# Patient Record
Sex: MALE
[De-identification: names, ages, dates, MRNs, and addresses within clinical notes are randomized per-mention and may not be internally consistent; named-entity substitution may affect disease eponyms.]

---

## 2019-07-06 ENCOUNTER — HOSPITAL ENCOUNTER (EMERGENCY)
Dept: HOSPITAL 88 - ER | Age: 49
Discharge: HOME | End: 2019-07-06
Payer: COMMERCIAL

## 2019-07-06 VITALS — HEIGHT: 68 IN | BODY MASS INDEX: 30.31 KG/M2 | WEIGHT: 200 LBS

## 2019-07-06 VITALS — DIASTOLIC BLOOD PRESSURE: 85 MMHG | SYSTOLIC BLOOD PRESSURE: 126 MMHG

## 2019-07-06 DIAGNOSIS — H60.311: Primary | ICD-10-CM

## 2019-07-06 PROCEDURE — 99282 EMERGENCY DEPT VISIT SF MDM: CPT

## 2019-07-06 NOTE — XMS REPORT
Clinical Summary

                             Created on: 2019



Dimas Navarro

External Reference #: [id number]

: 1970

Sex: Male



Demographics







                          Address                   [address] [place]  [address]

 

                          Home Phone                [phone]

 

                          Preferred Language        Unknown

 

                          Marital Status            Single

 

                          Ep[employer] Affiliation     [employer]

 

                          Race                      Unknown

 

                          Ethnic Group              Unknown





Author







                          Author                    [facility]

 

                          Organization              [facility]

 

                          Address                   Unknown

 

                          Phone                     Unavailable







Support







                Name            Relationship    Address         Phone

 

                    Hoa Navarro      [employer]                [address] [place]  [address]                     [phone]







Care Team Providers







                    Care Team Member Name    Role                Phone

 

                    Tabatha Bello MD    PCP                 [phone]







Allergies







                                        Comments



                 Active Allergy     Reactions       Severity        Noted Date 

 

                                        



Pt unsure of reaction-diagnosed as a child



                           Penicillins               2019 







Medications







                          End Date                  Status



              Medication     Sig          Dispensed     Refills      Start  



                                         Date  

 

                                                    Active



              lisinopril (PRINIVIL) 10     Take 1 tablet     90 tablet     1            /201  



                     mg tabletIndications:     by mouth            9  



                           Essential hypertension     daily.     







Active Problems





Not on file



Encounters







                          Care Team                 Description



                     Date                Type                Specialty  

 

                                        



Tabatha Bello MD                      Need for vaccination (Primary Dx); 

Essential hypertension



                     2019          Office Visit        Family Practice  

 

                                                     



                           2019                Travel   



after 2018



Immunizations







  



                     Name                Administration Dates     Next Due

 

  



                           Tdap (Tetanus Toxoid,     2019 (Deferred: Patient Refused) 



                                         Reduced Diphtheria Toxoid  



                                         And Acellular Pertussis,  



                                         Absorbed)  







Family History







   



                 Relation        Name            Status          Comments

 

   



                     Brother             1                   Alive 

 

   



                           Father                     

 

   



                           Maternal Grandfather       

 

   



                           Maternal Grandmother       

 

   



                           Mother                    Alive 

 

   



                           Paternal Grandfather       

 

   



                           Paternal Grandmother       

 

   



                     Sister              1                   Alive 







Social History







                                        Date



                 Tobacco Use     Types           Packs/Day       Years Used 

 

                                         



                                         Never Smoker    

 

    



                                         Smokeless Tobacco: Never   



                                         Used   









                    Drinks/Week         oz/Week             Comments



                                         Alcohol Use   

 

                                                             



                                         Not Currently   









  



                     Food Insecurity     Answer              Date Recorded

 

  



                     Within the past 12 months, you worried that your     Never true          2019



                                         food would run out before you got money to buy  



                                         more.  

 

  



                     Within the past 12 months, the food you bought     Never true          2019



                                         just didn't last and you didn't have money to get  



                                         more.  









 



                           Sex Assigned at Birth     Date Recorded

 

 



                                         Not on file 









                                        Industry



                           Job Start Date            Occupation 

 

                                        Not on file



                           Not on file               Not on file 









                                        Travel End



                           Travel History            Travel Start 

 





                                         No recent travel history available.







Last Filed Vital Signs







                    Reading             Time Taken          Comments



                                         Vital Sign   

 

                    134/84              2019  3:05 PM CDT     



                                         Blood Pressure   

 

                    100                 2019  3:05 PM CDT     



                                         Pulse   

 

                    37.1 C (98.7 F)    2019  3:05 PM CDT     



                                         Temperature   

 

                    18                  2019  3:05 PM CDT     



                                         Respiratory Rate   

 

                    -                   -                    



                                         Oxygen Saturation   

 

                    -                   -                    



                                         Inhaled Oxygen   



                                         Concentration   

 

                    86.2 kg (190 lb)    2019  3:05 PM CDT     



                                         Weight   

 

                    172.7 cm (5' 8")    2019  3:05 PM CDT     



                                         Height   

 

                    28.89               2019  3:05 PM CDT     



                                         Body Mass Index   







Plan of Treatment







   



                 Health Maintenance     Due Date        Last Done       Comments

 

   



                           IMM Influenza Seasonal     10/01/2019  



                                         Oct to March (>/=19 yrs)   







Procedures







                                        Comments



                 Procedure Name     Priority        Date/Time       Associated Diagnosis 

 

                                        



Results for this procedure are in the results section.



                     POC BMP - IN LAB (STAT)     Routine             2019  



                                         4:04 PM CDT  



after 2018



Results

* BMP POC (2019  4:04 PM CDT)





    



              Component     Value        Ref Range     Performed At     Pathologist



                                         Signature

 

    



                 TCO2 POC        26              21 - 32 mmol/L     [facility] 

 

    



                 Chloride POC     103             98 - 107 mmol/L     [facility] 

 

    



                 Potassium POC     4.1             3.50 - 5.10 mmol/L     [facility] 

 

    



                 Sodium POC      142             136 - 145 mmol/L     [facility] 

 

    



                 Glucose POC     130 (H)         74 - 106 mg/dL     [facility] 

 

    



                 Urea Nitrogen     18              7 - 18 mg/dL     [facility] 



                                         POC    

 

    



                 Creatinine POC     0.9             0.6 - 1.3 mg/dL     [facility] 

 

    



                 Ionized Calcium     1.09 (L)        1.15 - 1.29 mmol/L     [facility] 



                                         POC    

 

    



                 GFR, Estimated     >60             mL/min/1.73 m2     [facility] 

 

    



                 GFR, Estim,     >60             mL/min/1.73 m2     [facility] 



                                         Afr-Am    













                                         Specimen

 











   



                 Performing Organization     Address         City/State/Zipcode     Phone Number

 

   



                                         [facility]   

 

   



                                         [facility]   





after 2018



Insurance







                                        Type



            Payer      Benefit     Subscriber ID     Effective     Phone      Address 



                           Plan /                    Dates   



                                         Group     

 

                                         



            [facility] CHOICE     xxxxxxxxx     2019-P     [phone]     [address]

 



                     [address]                [address]              [id number] 



                                         [place], 



                                         [place] 



                                         [address] 

 

                                         



            [employer]     [employer]     xxxxxxxxxx     3/1/2019-P     [phone]     [address] 



                     [employer]          resent              [phone] 



                           [place], [place] 



                                         [address] 









     



            Guarantor Name     Account     Relation to     Date of     Phone      Billing Address



                     Type                Patient             Birth  

 

     



            Dimas Navarro     Personal/F     Self       1970     [phone] [address]               ([place])              [place] [address]

## 2019-07-06 NOTE — XMS REPORT
Patient Summary Document

                             Created on: 2019



LU AGUIRRE

External Reference #: [id number]

: 1970

Sex: Male



Demographics







                          Address                   [address] [place]  [address]

 

                          Home Phone                [phone]

 

                          Preferred Language        Unknown

 

                          Marital Status            Unknown

 

                          Baptism Affiliation     Unknown

 

                          Race                      Unknown

 

                          Ethnic Group              Unknown





Author







                          Author                    [facility]

 

                          [facility]

 

                          Address                   Unknown

 

                          Phone                     Unavailable







Support







                Name            Relationship    Address         Phone

 

                    SAMUEL SANABRIA      [employer]                 [address] [place], [place]  [id number] [phone]

 

                    SAMUEL SANABRIA      PRS                 [address] [place]  [address]                      [phone]

 

                    MARISOL AGUIRRE    PRS                 [address] [place]  [address]                      [phone]







Care Team Providers







                    Care Team Member Name    Role                Phone

 

                          Unavailable               Unavailable







Payers







             Payer Name    Policy Type    Policy Number    Effective Date    Expiration Date







Problems

This patient has no known problems.



Allergies, Adverse Reactions, Alerts







          Allergy Name    Allergy Type    Status    Severity    Reaction(s)    Onset Date    Inactive 

Date                      Treating Clinician        Comments

 

        Penicillins    DA      Active    U               2019 00:00:00                     

 

        No Known Contrast Allergies    DA      Active    U               2003-10-17 00:00:00                     

 

        No Known Food Allergies    DA      Active    U               2003-10-17 00:00:00                     

 

        No Known Other Allergies    DA      Active    U               2003-10-17 00:00:00                     

 

        PENICILLIN    DA      Active    U               2003-10-17 00:00:00                     







Medications

This patient has no known medications.



Encounters







             Start Date/Time    End Date/Time    Encounter Type    Admission Type    Attending Clinicians

                    Care Facility       Care Department     Encounter ID

 

        2019 15:46:08    2019 15:46:08    Outpatient                    [facility]     [id number]

 

        2019 15:00:57    2019 15:00:57    Outpatient                    [facility]     [id number]







Results







           Test Description    Test Time    Test Comments    Text Results    Atomic Results    Result

 Comments

 

                TROPONIN-I      2019 13:46:00                      

 

   

 

                TROPONIN-I (test code=TROPI)    <0.015 ng/mL    0-0.045          





BASIC METABOLIC [id number] 13:43:00* 





                Test Item       Value           Reference Range    Comments

 

                SODIUM (test code=NA)    139 mmol/L      136-145          

 

                POTASSIUM (test code=K)    3.7 mmol/L      3.5-5.1          

 

                CHLORIDE (test code=CL)    105.0 mmol/L               

 

                CARBON DIOXIDE (test code=CO2)    25.0 mmol/L     21-32            

 

                ANION GAP (test code=GAP)    12.7            10-20            

 

                GLUCOSE (test code=GLU)    114 mg/dL                  

 

                BLOOD UREA NITROGEN (test code=BUN)    12 mg/dL        7-18             

 

                GLOMERULAR FILTRATION RATE (test code=GFR)    > 60 mL/min     >=60            Estimated GFR by 

using Modified MDRD formula.Chronic kidney disease is defined as either kidney 
damageor GFR <60 mL/min/1.73 m2 for >3 months.

 

                CREATININE (test code=CREAT)    1.00 mg/dL      0.7-1.3          

 

                BUN/CREATININE RATIO (test code=BUN/CREA)    12.0            10-20            

 

                CALCIUM (test code=CA)    8.8 mg/dL       8.5-10.1         





BASIC METABOLIC [id number] 13:40:00* 





                Test Item       Value           Reference Range    Comments

 

                SODIUM (test code=NA)    139 mmol/L      136-145          

 

                POTASSIUM (test code=K)    3.7 mmol/L      3.5-5.1          

 

                CHLORIDE (test code=CL)    105.0 mmol/L               

 

                CARBON DIOXIDE (test code=CO2)     mmol/L         21-32            

 

                ANION GAP (test code=GAP)                    10-20            

 

                GLUCOSE (test code=GLU)     mg/dL                     

 

                BLOOD UREA NITROGEN (test code=BUN)     mg/dL          7-18             

 

                GLOMERULAR FILTRATION RATE (test code=GFR)     mL/min         >=60             

 

                CREATININE (test code=CREAT)     mg/dL          0.7-1.3          

 

                BUN/CREATININE RATIO (test code=BUN/CREA)                    10-20            

 

                CALCIUM (test code=CA)     mg/dL          8.5-10.1         





- CT HEAD/BRAIN W/O [id number] 13:37:00  Name: LU AGUIRRE                
    [facility]                     : 1970 Age/S: 49  / M         
[address] [facility]                Unit #: [id number]     Loc:               
[place]  [address]              Phys: Greg Ferguson MD                        
                           Acct: [id number]  Dis Date:               Status: 
REG ER                                  PHONE #: [phone]     Exam Date: 
2019  1320                     FAX #: [phone]      Reason: headache 
dizzy                                      EXAMS:                               
               CPT CODE:      [id number] CT HEAD/BRAIN W/O CONT                  
  15[address]                    HISTORY: Headache and hypertension.               
COMPARISON: None available.                CT brain without contrast: Automated 
exposure control.               No acute intracranial bleeds or extra-axial 
collections and  there is       no acute territorial vascular infarction.       
       The gray-white matter differentiation is preserved. The sulci, gyri,     
 ventricles and subarachnoid spaces and the basilar cisterns are normal       
for patient's age. No herniation or hydrocephalus or midline shift is       
noted. Fourth ventricle remains midline.               Portions of the 
visualized paranasal sinuses are unremarkable.               No obvious bony 
calvarial defect is noted.                 IMPRESSION:                   No 
acute intracranial bleeds or extra-axial collections.                   No acute
territorial vascular infarction.                   No herniation or hydrocepha
haroldo or midline shift.                    ** Electronically Signed by WAI Howard on 2019 at 1337 **                      Reported and signed by: Kalyan Howard M.D.             CC: Greg Ferguson MD                                
                                                                                
      Technologist:Marcelina BelcherRT(R),CT            CTDI:        DLP:        Tr
nscb Date/Time: 2019 (2449) t.SDR.TH4                        Orig Print D/
T: S: 2019 (1649)       CTDI:          DLP:          PAGE  1              
        Signed Report                               CBC W/AUTO [id number] 
13:20:00* 





                Test Item       Value           Reference Range    Comments

 

                WHITE BLOOD CELL (test code=WBC)    8.5 K/mm3       4.5-12.5         

 

                RED BLOOD CELL (test code=RBC)    5.30 mill/mm3    4.0-5.8          

 

                HEMOGLOBIN (test code=HGB)    15.9 gram/dL    13.0-17.5        

 

                HEMATOCRIT (test code=HCT)    47.8 %          42.0-52.0        

 

                MEAN CELL VOLUME (test code=MCV)    90.2 fL         80-98            

 

                MEAN CELL HGB (test code=MCH)    30.0 picogram    27.0-33.0        

 

                MEAN CELL HGB CONCETRATION (test code=MCHC)    33.3 gram/dL    33.0-36.0        

 

                RED CELL DISTRIBUTION WIDTH (test code=RDW)    13.1 %          11.6-16.2        

 

                RED CELL DISTRIBUTION WIDTH SD (test code=RDW-SD)    43.0 fL         37.0-51.0        

 

                PLATELET COUNT (test code=PLT)    200 K/mm3       150-450          

 

                MEAN PLATELET VOLUME (test code=MPV)    10.8 fL         6.7-11.0         

 

                NEUTROPHIL % (test code=NT%)    80.9 %          39.0-69.0        

 

                IMMATURE GRANULOCYTE % (test code=IG%)    0.4 %           0.0-5.0          

 

                LYMPHOCYTE % (test code=LY%)    13.7 %          25.0-55.0        

 

                MONOCYTE % (test code=MO%)    4.7 %           0.0-10.0         

 

                EOSINOPHIL % (test code=EO%)    0.1 %           0.0-5.0          

 

                BASOPHIL % (test code=BA%)    0.2 %           0.0-1.0          

 

                NUCLEATED RBC % (test code=NRBC%)    0.0 %           0-0              

 

                NEUTROPHIL # (test code=NT#)    6.88 K/mm3      1.8-7.7          

 

                IMMATURE GRANULOCYTE # (test code=IG#)    0.03 x10 3/uL    0-0.03           

 

                LYMPHOCYTE # (test code=LY#)    1.17 K/mm3      1.0-5.0          

 

                MONOCYTE # (test code=MO#)    0.40 K/mm3      0-0.8            

 

                EOSINOPHIL # (test code=EO#)    0.01 K/mm3      0.0-0.5          

 

                BASOPHIL # (test code=BA#)    0.02 K/mm3      0.0-0.2          

 

                NUCLEATED RBC # (test code=NRBC#)    0.00 K/mm3      0.0-0.1          

 

                MANUAL DIFF REQUIRED (test code=MDIFF)    NO                               





CBC W/AUTO [id number] 13:17:00* 





                Test Item       Value           Reference Range    Comments

 

                WHITE BLOOD CELL (test code=WBC)     K/mm3          4.5-12.5         

 

                RED BLOOD CELL (test code=RBC)     mill/mm3       4.0-5.8          

 

                HEMOGLOBIN (test code=HGB)    15.9 gram/dL    13.0-17.5        

 

                HEMATOCRIT (test code=HCT)    47.8 %          42.0-52.0        

 

                MEAN CELL VOLUME (test code=MCV)     fL             80-98            

 

                MEAN CELL HGB (test code=MCH)     picogram       27.0-33.0        

 

                MEAN CELL HGB CONCETRATION (test code=MCHC)     gram/dL        33.0-36.0        

 

                RED CELL DISTRIBUTION WIDTH (test code=RDW)     %              11.6-16.2        

 

                RED CELL DISTRIBUTION WIDTH SD (test code=RDW-SD)     fL             37.0-51.0        

 

                PLATELET COUNT (test code=PLT)     K/mm3          150-450          

 

                MEAN PLATELET VOLUME (test code=MPV)     fL             6.7-11.0         

 

                NEUTROPHIL % (test code=NT%)     %              39.0-69.0        

 

                IMMATURE GRANULOCYTE % (test code=IG%)     %              0.0-5.0          

 

                LYMPHOCYTE % (test code=LY%)     %              25.0-55.0        

 

                MONOCYTE % (test code=MO%)     %              0.0-10.0         

 

                EOSINOPHIL % (test code=EO%)     %              0.0-5.0          

 

                BASOPHIL % (test code=BA%)     %              0.0-1.0          

 

                NEUTROPHIL # (test code=NT#)     K/mm3          1.8-7.7          

 

                LYMPHOCYTE # (test code=LY#)     K/mm3          1.0-5.0          

 

                MONOCYTE # (test code=MO#)     K/mm3          0-0.8            

 

                EOSINOPHIL # (test code=EO#)     K/mm3          0.0-0.5          

 

                BASOPHIL # (test code=BA#)     K/mm3          0.0-0.2